# Patient Record
Sex: FEMALE | Race: BLACK OR AFRICAN AMERICAN | NOT HISPANIC OR LATINO | URBAN - METROPOLITAN AREA
[De-identification: names, ages, dates, MRNs, and addresses within clinical notes are randomized per-mention and may not be internally consistent; named-entity substitution may affect disease eponyms.]

---

## 2021-06-10 ENCOUNTER — OFFICE VISIT (OUTPATIENT)
Dept: URBAN - METROPOLITAN AREA CLINIC 92 | Facility: CLINIC | Age: 26
End: 2021-06-10
Payer: OTHER GOVERNMENT

## 2021-06-10 DIAGNOSIS — Z83.79 FAMILY HISTORY OF CELIAC DISEASE: ICD-10-CM

## 2021-06-10 DIAGNOSIS — D50.8 ACQUIRED IRON DEFICIENCY ANEMIA DUE TO DECREASED ABSORPTION: ICD-10-CM

## 2021-06-10 DIAGNOSIS — R11.0 NAUSEA: ICD-10-CM

## 2021-06-10 DIAGNOSIS — K59.09 CHRONIC CONSTIPATION: ICD-10-CM

## 2021-06-10 PROCEDURE — 99204 OFFICE O/P NEW MOD 45 MIN: CPT | Performed by: INTERNAL MEDICINE

## 2021-06-10 PROCEDURE — 99244 OFF/OP CNSLTJ NEW/EST MOD 40: CPT | Performed by: INTERNAL MEDICINE

## 2021-06-10 NOTE — HPI-TODAY'S VISIT:
The patient was referred by Dr. Chava Burrell for constipation .   A copy of this document is being forwarded to the referring provider. She was dx with anemia and hypothyroidism a few years ago, told to take Fe which she  takes prn but [facility] labs still c/w anemia: Hg 11.6, Hct 35.6 MCV 98.1 (no iron studies). She notes constipation for years, not worse with Fe or thyroid  issues and unchanged over the years. She has BM Q2-3days, no hematochezia or melena. Occasionally hard stools. No associated abd pain or cramping (except with cycles). She notes chronic nausea, daily, better with gingerale and small snacks and worse on empty stomach. NSAIDs only for cycles. No vomiting. No GERD sx. Does note brain fog. Mom with celiac disease and CIC but no colon CA or IBD.

## 2021-06-12 LAB
DEAMIDATED GLIADIN ABS, IGA: 4
DEAMIDATED GLIADIN ABS, IGG: 2
ENDOMYSIAL ANTIBODY IGA: NEGATIVE
FERRITIN, SERUM: 63
HEMATOCRIT: 37.3
HEMOGLOBIN: 11.8
IMMUNOGLOBULIN A, QN, SERUM: 204
IRON BIND.CAP.(TIBC): 380
IRON SATURATION: 10
IRON: 39
MCH: 32.2
MCHC: 31.6
MCV: 102
NRBC: (no result)
PLATELETS: 299
RBC: 3.67
RDW: 11.1
T-TRANSGLUTAMINASE (TTG) IGA: <2
T-TRANSGLUTAMINASE (TTG) IGG: <2
UIBC: 341
WBC: 4.4

## 2021-06-14 ENCOUNTER — TELEPHONE ENCOUNTER (OUTPATIENT)
Dept: URBAN - METROPOLITAN AREA CLINIC 92 | Facility: CLINIC | Age: 26
End: 2021-06-14

## 2021-06-21 ENCOUNTER — TELEPHONE ENCOUNTER (OUTPATIENT)
Dept: URBAN - METROPOLITAN AREA CLINIC 92 | Facility: CLINIC | Age: 26
End: 2021-06-21

## 2021-06-21 RX ORDER — ONDANSETRON HYDROCHLORIDE 4 MG/1
1 TABLET TABLET, FILM COATED ORAL
Qty: 60 | Refills: 0 | OUTPATIENT
Start: 2021-06-21

## 2021-07-12 ENCOUNTER — OFFICE VISIT (OUTPATIENT)
Dept: URBAN - METROPOLITAN AREA CLINIC 124 | Facility: CLINIC | Age: 26
End: 2021-07-12
Payer: OTHER GOVERNMENT

## 2021-07-12 VITALS
BODY MASS INDEX: 23.14 KG/M2 | DIASTOLIC BLOOD PRESSURE: 77 MMHG | WEIGHT: 144 LBS | HEIGHT: 66 IN | TEMPERATURE: 97.2 F | HEART RATE: 71 BPM | SYSTOLIC BLOOD PRESSURE: 118 MMHG

## 2021-07-12 DIAGNOSIS — Z83.79 FAMILY HISTORY OF CELIAC DISEASE: ICD-10-CM

## 2021-07-12 DIAGNOSIS — D64.9 ANEMIA, UNSPECIFIED TYPE: ICD-10-CM

## 2021-07-12 DIAGNOSIS — K59.09 CHRONIC CONSTIPATION: ICD-10-CM

## 2021-07-12 DIAGNOSIS — K21.9 GERD WITHOUT ESOPHAGITIS: ICD-10-CM

## 2021-07-12 DIAGNOSIS — R11.0 NAUSEA: ICD-10-CM

## 2021-07-12 PROCEDURE — 99214 OFFICE O/P EST MOD 30 MIN: CPT | Performed by: INTERNAL MEDICINE

## 2021-07-12 RX ORDER — FAMOTIDINE 40 MG/1
1 TABLET AT BEDTIME TABLET, FILM COATED ORAL ONCE A DAY
Qty: 90 | Refills: 3 | OUTPATIENT
Start: 2021-07-12

## 2021-07-12 RX ORDER — ONDANSETRON HYDROCHLORIDE 4 MG/1
1 TABLET TABLET, FILM COATED ORAL
Qty: 60 | Refills: 0 | Status: ACTIVE | COMMUNITY
Start: 2021-06-21

## 2021-07-12 RX ORDER — PANTOPRAZOLE SODIUM 40 MG/1
1 TABLET TABLET, DELAYED RELEASE ORAL ONCE A DAY
Qty: 90 | Refills: 3 | OUTPATIENT
Start: 2021-07-12

## 2021-07-12 RX ORDER — POLYETHYLENE GLYCOL 3350 17 G/17G
AS DIRECTED POWDER, FOR SOLUTION ORAL
Status: ACTIVE | COMMUNITY

## 2021-07-12 NOTE — HPI-TODAY'S VISIT:
25yoF seen last month for constipation. She was dx with anemia and hypothyroidism a few years ago, told to take Fe which she  takes prn but [facility] labs still c/w anemia: Hg 11.6, Hct 35.6 MCV 98.1 (no iron studies). Repeat labs as below (anemia resolved). SHe does have cycles for 6 days, heavy at onset.  She noted constipation for years, not worse with Fe or thyroid  issues and unchanged over the years. She has BM Q2-3days, no hematochezia or melena. Occasionally hard stools. No associated abd pain or cramping (except with cycles). Advised to start daily miralax and constipation has resolved to BM daily and easy to pass.   She noted persistent chronic nausea, better with gingerale and small snacks and worse on empty stomach at her last OV. H pylori neg. Now notes nausea is still present and occurs about 3 times/week and improves with zofran. Not related to po intake--sometimes occurs with eating sometimes not related.  NSAIDs only for cycles. No vomiting. She has mild GERD for the last year and notes this has increased over the last 2-3m with chest pain and increased indigestion as well as some regurg. High coffee and spicy food intake. Went to ER at [facility] 2-3 weeks ago for chest pain and neg EKG, cardiac enzymes and d/c on PPI daily but despite this has reflux daily, wosre at night. Chest pain is knot-like pain in the center of her chest without dysphagia Does note brain fog. Mom with celiac disease and CIC but no colon CA or IBD.

## 2021-09-14 ENCOUNTER — CLAIMS CREATED FROM THE CLAIM WINDOW (OUTPATIENT)
Dept: URBAN - METROPOLITAN AREA CLINIC 4 | Facility: CLINIC | Age: 26
End: 2021-09-14
Payer: OTHER GOVERNMENT

## 2021-09-14 ENCOUNTER — OFFICE VISIT (OUTPATIENT)
Dept: URBAN - METROPOLITAN AREA SURGERY CENTER 16 | Facility: SURGERY CENTER | Age: 26
End: 2021-09-14
Payer: OTHER GOVERNMENT

## 2021-09-14 DIAGNOSIS — K29.60 ADENOPAPILLOMATOSIS GASTRICA: ICD-10-CM

## 2021-09-14 DIAGNOSIS — K29.40 CHRONIC ATROPHIC GASTRITIS WITHOUT BLEEDING: ICD-10-CM

## 2021-09-14 PROCEDURE — 43239 EGD BIOPSY SINGLE/MULTIPLE: CPT | Performed by: INTERNAL MEDICINE

## 2021-09-14 PROCEDURE — 88342 IMHCHEM/IMCYTCHM 1ST ANTB: CPT | Performed by: PATHOLOGY

## 2021-09-14 PROCEDURE — G8907 PT DOC NO EVENTS ON DISCHARG: HCPCS | Performed by: INTERNAL MEDICINE

## 2021-09-14 PROCEDURE — 88305 TISSUE EXAM BY PATHOLOGIST: CPT | Performed by: PATHOLOGY

## 2021-10-01 ENCOUNTER — WEB ENCOUNTER (OUTPATIENT)
Dept: URBAN - METROPOLITAN AREA CLINIC 92 | Facility: CLINIC | Age: 26
End: 2021-10-01

## 2021-10-01 ENCOUNTER — OFFICE VISIT (OUTPATIENT)
Dept: URBAN - METROPOLITAN AREA TELEHEALTH 2 | Facility: TELEHEALTH | Age: 26
End: 2021-10-01
Payer: OTHER GOVERNMENT

## 2021-10-01 DIAGNOSIS — R11.0 NAUSEA: ICD-10-CM

## 2021-10-01 DIAGNOSIS — R14.2 ERUCTATION: ICD-10-CM

## 2021-10-01 DIAGNOSIS — K21.9 GERD WITHOUT ESOPHAGITIS: ICD-10-CM

## 2021-10-01 DIAGNOSIS — K59.09 CHRONIC CONSTIPATION: ICD-10-CM

## 2021-10-01 DIAGNOSIS — Z83.79 FAMILY HISTORY OF CELIAC DISEASE: ICD-10-CM

## 2021-10-01 DIAGNOSIS — D64.89 ANEMIA DUE TO OTHER CAUSE: ICD-10-CM

## 2021-10-01 PROCEDURE — 99214 OFFICE O/P EST MOD 30 MIN: CPT | Performed by: INTERNAL MEDICINE

## 2021-10-01 RX ORDER — PANTOPRAZOLE SODIUM 40 MG/1
1 TABLET TABLET, DELAYED RELEASE ORAL ONCE A DAY
Qty: 90 | Refills: 3 | Status: ACTIVE | COMMUNITY
Start: 2021-07-12

## 2021-10-01 RX ORDER — PANTOPRAZOLE SODIUM 40 MG/1
1 TABLET TABLET, DELAYED RELEASE ORAL ONCE A DAY
Qty: 90 | Refills: 3 | OUTPATIENT

## 2021-10-01 RX ORDER — FAMOTIDINE 40 MG/1
1 TABLET AT BEDTIME TABLET, FILM COATED ORAL ONCE A DAY
Qty: 90 | Refills: 3 | OUTPATIENT

## 2021-10-01 RX ORDER — ONDANSETRON HYDROCHLORIDE 4 MG/1
1 TABLET TABLET, FILM COATED ORAL
Qty: 60 | Refills: 0 | Status: ACTIVE | COMMUNITY
Start: 2021-06-21

## 2021-10-01 RX ORDER — POLYETHYLENE GLYCOL 3350 17 G/17G
AS DIRECTED POWDER, FOR SOLUTION ORAL
Status: ACTIVE | COMMUNITY

## 2021-10-01 RX ORDER — FAMOTIDINE 40 MG/1
1 TABLET AT BEDTIME TABLET, FILM COATED ORAL ONCE A DAY
Qty: 90 | Refills: 3 | Status: ACTIVE | COMMUNITY
Start: 2021-07-12

## 2021-10-01 NOTE — HPI-TODAY'S VISIT:
25yoF seen in June for constipation. She was dx with anemia and hypothyroidism a few years ago, told to take Fe which she takes prn but [facility] labs still c/w anemia: Hg 11.6, Hct 35.6 MCV 98.1 (no iron studies). Repeat labs as below (anemia resolved). SHe does have cycles for 6 days, heavy at onset.      She noted constipation for years w/ BM Q 3days, no hematochezia or melena. Occasionally hard stools. No associated abd pain or cramping (except with cycles). Advised to start daily miralax and constipation has resolved w/ BM Q2D, easier to pass.   Also noted chronic nausea, better with gingerale and small snacks. H pylori neg. NSAIDs only for cycles. No vomiting. She has mild GERD for the last year, increased X  2-3m with chest pain and increased indigestion as well as some regurg. Went to ER at [facility] in June and neg EKG, cardiac enzymes and d/c on PPI daily but despite this noted persistent reflux daily, wosre at night.  EGD 9/14/2021 gastritis and food in stomach, bx + chronic inactive gastritis suggestive of treated H pylori Given H2B at night with AM PPI and notes improvement in nausea and regurg but still has indigestion and post-prandial eructation and fullness.   Mom with celiac disease and CIC but no colon CA or IBD.

## 2021-11-01 ENCOUNTER — WEB ENCOUNTER (OUTPATIENT)
Dept: URBAN - METROPOLITAN AREA CLINIC 92 | Facility: CLINIC | Age: 26
End: 2021-11-01

## 2021-11-08 ENCOUNTER — DASHBOARD ENCOUNTERS (OUTPATIENT)
Age: 26
End: 2021-11-08

## 2021-11-08 ENCOUNTER — OFFICE VISIT (OUTPATIENT)
Dept: URBAN - METROPOLITAN AREA CLINIC 124 | Facility: CLINIC | Age: 26
End: 2021-11-08
Payer: OTHER GOVERNMENT

## 2021-11-08 VITALS
TEMPERATURE: 97 F | DIASTOLIC BLOOD PRESSURE: 77 MMHG | SYSTOLIC BLOOD PRESSURE: 110 MMHG | BODY MASS INDEX: 22.14 KG/M2 | HEART RATE: 75 BPM | WEIGHT: 137.8 LBS | HEIGHT: 66 IN

## 2021-11-08 DIAGNOSIS — R11.0 NAUSEA: ICD-10-CM

## 2021-11-08 DIAGNOSIS — K31.84 GASTROPARESIS: ICD-10-CM

## 2021-11-08 DIAGNOSIS — R14.2 ERUCTATION: ICD-10-CM

## 2021-11-08 DIAGNOSIS — D64.9 ANEMIA, UNSPECIFIED TYPE: ICD-10-CM

## 2021-11-08 DIAGNOSIS — K21.9 GERD WITHOUT ESOPHAGITIS: ICD-10-CM

## 2021-11-08 DIAGNOSIS — K59.09 CHRONIC CONSTIPATION: ICD-10-CM

## 2021-11-08 DIAGNOSIS — Z83.79 FAMILY HISTORY OF CELIAC DISEASE: ICD-10-CM

## 2021-11-08 PROBLEM — 266435005: Status: ACTIVE | Noted: 2021-07-12

## 2021-11-08 PROBLEM — 235675006: Status: ACTIVE | Noted: 2021-11-05

## 2021-11-08 PROCEDURE — 99213 OFFICE O/P EST LOW 20 MIN: CPT | Performed by: PHYSICIAN ASSISTANT

## 2021-11-08 RX ORDER — ONDANSETRON HYDROCHLORIDE 4 MG/1
1 TABLET TABLET, FILM COATED ORAL
Qty: 60 | Refills: 0 | Status: ACTIVE | COMMUNITY
Start: 2021-06-21

## 2021-11-08 RX ORDER — POLYETHYLENE GLYCOL 3350 17 G/17G
AS DIRECTED POWDER, FOR SOLUTION ORAL
Status: ACTIVE | COMMUNITY

## 2021-11-08 RX ORDER — FAMOTIDINE 40 MG/1
1 TABLET AT BEDTIME TABLET, FILM COATED ORAL ONCE A DAY
Qty: 90 | Refills: 3 | Status: ACTIVE | COMMUNITY

## 2021-11-08 RX ORDER — FAMOTIDINE 40 MG/1
1 TABLET AT BEDTIME TABLET, FILM COATED ORAL ONCE A DAY
Qty: 90 | Refills: 3 | OUTPATIENT

## 2021-11-08 RX ORDER — PANTOPRAZOLE SODIUM 40 MG/1
1 TABLET TABLET, DELAYED RELEASE ORAL ONCE A DAY
Qty: 90 | Refills: 3 | Status: ACTIVE | COMMUNITY

## 2021-11-08 RX ORDER — PANTOPRAZOLE SODIUM 40 MG/1
1 TABLET TABLET, DELAYED RELEASE ORAL ONCE A DAY
Qty: 90 | Refills: 3 | OUTPATIENT

## 2021-11-08 NOTE — HPI-TODAY'S VISIT:
25yoF seen in June for constipation. She was dx with anemia and hypothyroidism a few years ago, told to take Fe which she takes prn but [facility] labs still c/w anemia: Hg 11.6, Hct 35.6 MCV 98.1 (no iron studies). Repeat labs as below (anemia resolved). SHe does have cycles for 6 days, heavy at onset.          She noted constipation for years w/ BM Q 3days, no hematochezia or melena. Occasionally hard stools. No associated abd pain or cramping (except with cycles). Advised to start daily miralax and constipation has resolved w/ BM QD-Q2D, easier to pass.    Also noted chronic nausea, better with gingerale and small snacks. H pylori neg. NSAIDs only for cycles. No vomiting. She has mild GERD for the last year, increased X  2-3m with chest pain and increased indigestion as well as some regurg. Went to ER at [facility] in June and neg EKG, cardiac enzymes and d/c on PPI daily but despite this noted persistent reflux daily, wosre at night.   EGD 9/14/2021 gastritis and food in stomach, bx + chronic inactive gastritis suggestive of treated H pylori Given H2B at night with AM PPI but still noted indigestion and post-prandial eructation and fullness. GES cw gastroparesis with T1/2 of 89m (normal <60)  She has started a gastroparesis diet on her own and continues to have intermittent nausea but more rare, no evident triggers and no vomiting. Has a feeling of something in her chest but no chest pain. No regurgitation or heartburn but some increase in eructation.    She was diagnosed with B12 deficiency by endo but labs for autoimmune/pernicious anemia were negative (IF, anti-parietal)  Mom with celiac disease and CIC but no colon CA or IBD.

## 2021-11-16 ENCOUNTER — TELEPHONE ENCOUNTER (OUTPATIENT)
Dept: URBAN - METROPOLITAN AREA CLINIC 92 | Facility: CLINIC | Age: 26
End: 2021-11-16

## 2021-11-16 RX ORDER — FAMOTIDINE 40 MG/1
1 TABLET AT BEDTIME TABLET, FILM COATED ORAL ONCE A DAY
Qty: 90 | Refills: 0

## 2021-11-16 RX ORDER — PANTOPRAZOLE SODIUM 40 MG/1
1 TABLET TABLET, DELAYED RELEASE ORAL ONCE A DAY
Qty: 90 | Refills: 0

## 2021-11-19 ENCOUNTER — OFFICE VISIT (OUTPATIENT)
Dept: URBAN - METROPOLITAN AREA TELEHEALTH 2 | Facility: TELEHEALTH | Age: 26
End: 2021-11-19
Payer: OTHER GOVERNMENT

## 2021-11-19 DIAGNOSIS — K29.60 ADENOPAPILLOMATOSIS GASTRICA: ICD-10-CM

## 2021-11-19 DIAGNOSIS — K21.9 ACID REFLUX: ICD-10-CM

## 2021-11-19 PROCEDURE — 97802 MEDICAL NUTRITION INDIV IN: CPT | Performed by: DIETITIAN, REGISTERED

## 2021-11-19 RX ORDER — POLYETHYLENE GLYCOL 3350 17 G/17G
AS DIRECTED POWDER, FOR SOLUTION ORAL
Status: ACTIVE | COMMUNITY

## 2021-11-19 RX ORDER — PANTOPRAZOLE SODIUM 40 MG/1
1 TABLET TABLET, DELAYED RELEASE ORAL ONCE A DAY
Qty: 90 | Refills: 0 | Status: ACTIVE | COMMUNITY

## 2021-11-19 RX ORDER — ONDANSETRON HYDROCHLORIDE 4 MG/1
1 TABLET TABLET, FILM COATED ORAL
Qty: 60 | Refills: 0 | Status: ACTIVE | COMMUNITY
Start: 2021-06-21

## 2021-11-19 RX ORDER — FAMOTIDINE 40 MG/1
1 TABLET AT BEDTIME TABLET, FILM COATED ORAL ONCE A DAY
Qty: 90 | Refills: 0 | Status: ACTIVE | COMMUNITY

## 2021-11-19 NOTE — HPI-TODAY'S VISIT:
Nutrition initial visit:  Patient works night shift.  Her schedule is different from week to week.  On work days she sleeps during the day.  Eats at 6-7pm whens her starts, eats again 10pm-12am.  Gets home and eats something again at 7am.  Typical foods:  chicken minis, avocado toast.  Recently she has stopped eating the meal before she goes to sleep, but she will eat an oatmeal before leaving work at 5am.  At work she will eat leftovers (baked chicken or spaghett or stir glynn with rice or taco soup).  On her off days she will eat salads, sometimes chick ronit a (grilled with fries),  or she will air glynn niggets with sweet potato fries.  She drinks about 6ounces of decaf coffee with almond milk creamer and sugar.  She drinks water, gatorade and sweet tea a few times per week.  She takes a gummy vitamin daily and eats gummy snacks.

## 2021-11-24 ENCOUNTER — OFFICE VISIT (OUTPATIENT)
Dept: URBAN - METROPOLITAN AREA TELEHEALTH 2 | Facility: TELEHEALTH | Age: 26
End: 2021-11-24
Payer: OTHER GOVERNMENT

## 2021-11-24 DIAGNOSIS — K31.84 GASTROPARESIS: ICD-10-CM

## 2021-11-24 PROCEDURE — 97803 MED NUTRITION INDIV SUBSEQ: CPT | Performed by: DIETITIAN, REGISTERED

## 2021-11-24 RX ORDER — PANTOPRAZOLE SODIUM 40 MG/1
1 TABLET TABLET, DELAYED RELEASE ORAL ONCE A DAY
Qty: 90 | Refills: 0 | Status: ACTIVE | COMMUNITY

## 2021-11-24 RX ORDER — ONDANSETRON HYDROCHLORIDE 4 MG/1
1 TABLET TABLET, FILM COATED ORAL
Qty: 60 | Refills: 0 | Status: ACTIVE | COMMUNITY
Start: 2021-06-21

## 2021-11-24 RX ORDER — POLYETHYLENE GLYCOL 3350 17 G/17G
AS DIRECTED POWDER, FOR SOLUTION ORAL
Status: ACTIVE | COMMUNITY

## 2021-11-24 RX ORDER — FAMOTIDINE 40 MG/1
1 TABLET AT BEDTIME TABLET, FILM COATED ORAL ONCE A DAY
Qty: 90 | Refills: 0 | Status: ACTIVE | COMMUNITY